# Patient Record
Sex: FEMALE | Race: WHITE | NOT HISPANIC OR LATINO | Employment: PART TIME | URBAN - METROPOLITAN AREA
[De-identification: names, ages, dates, MRNs, and addresses within clinical notes are randomized per-mention and may not be internally consistent; named-entity substitution may affect disease eponyms.]

---

## 2019-03-13 ENCOUNTER — CONSULT (OUTPATIENT)
Dept: CARDIOLOGY | Facility: CLINIC | Age: 84
End: 2019-03-13

## 2019-03-13 ENCOUNTER — LAB REQUISITION (OUTPATIENT)
Dept: LAB | Facility: HOSPITAL | Age: 84
End: 2019-03-13

## 2019-03-13 VITALS
HEART RATE: 62 BPM | SYSTOLIC BLOOD PRESSURE: 138 MMHG | DIASTOLIC BLOOD PRESSURE: 86 MMHG | HEIGHT: 61 IN | BODY MASS INDEX: 21.71 KG/M2 | WEIGHT: 115 LBS

## 2019-03-13 DIAGNOSIS — I48.0 PAROXYSMAL ATRIAL FIBRILLATION (HCC): Primary | ICD-10-CM

## 2019-03-13 DIAGNOSIS — Z00.00 ROUTINE GENERAL MEDICAL EXAMINATION AT A HEALTH CARE FACILITY: ICD-10-CM

## 2019-03-13 DIAGNOSIS — I48.0 PAROXYSMAL ATRIAL FIBRILLATION (HCC): ICD-10-CM

## 2019-03-13 DIAGNOSIS — I42.0 DILATED CARDIOMYOPATHY (HCC): ICD-10-CM

## 2019-03-13 LAB
ALBUMIN SERPL-MCNC: 4.77 G/DL (ref 3.2–4.8)
ALP SERPL-CCNC: 126 U/L (ref 25–100)
ALT SERPL W P-5'-P-CCNC: 30 U/L (ref 7–40)
AST SERPL-CCNC: 31 U/L (ref 0–33)
BILIRUB CONJ SERPL-MCNC: 0 MG/DL (ref 0–0.2)
BILIRUB INDIRECT SERPL-MCNC: 0.2 MG/DL (ref 0.1–1.1)
BILIRUB SERPL-MCNC: 0.2 MG/DL (ref 0.3–1.2)
DEPRECATED FTI SERPL-MCNC: 3.1 TBI
PROT SERPL-MCNC: 6.9 G/DL (ref 5.7–8.2)
T3RU NFR SERPL: 26.9 % (ref 23–37)
T4 SERPL-MCNC: 11.6 MCG/DL (ref 4.7–11.4)
TSH SERPL DL<=0.05 MIU/L-ACNC: 1.74 MIU/ML (ref 0.35–5.35)

## 2019-03-13 PROCEDURE — 80076 HEPATIC FUNCTION PANEL: CPT | Performed by: INTERNAL MEDICINE

## 2019-03-13 PROCEDURE — 99214 OFFICE O/P EST MOD 30 MIN: CPT | Performed by: INTERNAL MEDICINE

## 2019-03-13 PROCEDURE — 84479 ASSAY OF THYROID (T3 OR T4): CPT | Performed by: INTERNAL MEDICINE

## 2019-03-13 PROCEDURE — 93000 ELECTROCARDIOGRAM COMPLETE: CPT | Performed by: INTERNAL MEDICINE

## 2019-03-13 PROCEDURE — 84443 ASSAY THYROID STIM HORMONE: CPT | Performed by: INTERNAL MEDICINE

## 2019-03-13 PROCEDURE — 84436 ASSAY OF TOTAL THYROXINE: CPT | Performed by: INTERNAL MEDICINE

## 2019-03-13 PROCEDURE — 36415 COLL VENOUS BLD VENIPUNCTURE: CPT | Performed by: INTERNAL MEDICINE

## 2019-03-13 RX ORDER — METOPROLOL SUCCINATE 50 MG/1
50 TABLET, EXTENDED RELEASE ORAL NIGHTLY
COMMUNITY

## 2019-03-13 RX ORDER — LEVOTHYROXINE SODIUM 0.07 MG/1
75 TABLET ORAL EVERY MORNING
COMMUNITY

## 2019-03-13 RX ORDER — OMEPRAZOLE 40 MG/1
40 CAPSULE, DELAYED RELEASE ORAL EVERY MORNING
COMMUNITY

## 2019-03-13 RX ORDER — AMIODARONE HYDROCHLORIDE 200 MG/1
200 TABLET ORAL NIGHTLY
COMMUNITY

## 2019-03-13 NOTE — PROGRESS NOTES
Electrophysiology Consult     Melly Galvez  1935  [unfilled]  [unfilled]    03/13/19    DATE OF ADMISSION: (Not on file)  [facility] CARDIOLOGY    Evin Albert MD  [address] / [place] [id number]    Chief Complaint   Patient presents with   • Atrial Fibrillation     Problem List:  1. Persistent Atrial Fibrillation   a. CHADASVasc = 5 previously on Xarelto x 3 weeks 9/2018, discontinued due to GIB  b. Diagnosed 2017, treated with Metoprolol  c. Recurrent atrial fib with RVR in setting of acute CHF, started on Amiodarone 8/2018  2. Chronic Systolic CHF  a. Echocardiogram 8/15/18: EF 20-25%, severe global HK  b. Echocardiogram 11/8/18: EF 30-40%  3. Hematuria with UTI diagnosis 9/2018  4. History of GI Bleed  a. GIB 9/2018 after taking Xarelto x 3 weeks - requiring 2 units blood  b. Severe Fe Deficiency   c. EGD and colonscopy Fall 2018: AVM in right colon - s/p cauterization  d. Repeat blood work 12/2018 - normalized H& H  5. Tobacco abuse   6. Hypothyroidism   7. IBS  8. Restless Leg Syndrome   9. Surgical History  a. Hysterectomy  b. Ovarian cyst removal    History of Present Illness:   83 year old WF with above history who is referred by Dr. Soria for atrial fibrillation and consideration of Watchman Device. She was diagnosed with atrial fibrillation in 2017. At that time, she was fairly asymptomatic and was treated with Metoprolol. However, in August 2018, she presented to [facility] with shortness of breath and found to be in rapid atrial fibrillation. Her echocardiogram at that time showed EF 20-25%. She was started on Amiodarone and converted to NSR. She was also started on Xarelto. Three weeks later, she had a severe GI Bleed and required 2 units of blood. She was found to have an AVM in her right colon which was cauterized. About a week after that, she had some hematuria but was treated for a UTI and it resolved. Since then, she has been on a baby ASA with no  further bleeding issues. In regards to her atrial fibrillation, she is doing well on Amiodarone with no recurrence that she is aware of since November. She denies side effects to Amiodarone. She denies SOB, CP, RUEDA, swelling, dizziness, syncope.   She smokes 1/2 PPD cigarettes. She drinks 2 cups of coffee daily and some tea. She denies ETOH use. 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 Latest echocardiogram 11/2018 showed EF 30-40%. She has been on Metoprolol and was on Lisinopril but it caused a cough. She has an appointment with Dr. Soria at the end of this month.       Allergies   Allergen Reactions   • Lisinopril Cough        Cannot display prior to admission medications because the patient has not been admitted in this contact.            Current Outpatient Medications:   •  amiodarone (PACERONE) 200 MG tablet, Take 200 mg by mouth Daily., Disp: , Rfl:   •  aspirin 81 MG tablet, Take 81 mg by mouth Daily., Disp: , Rfl:   •  levothyroxine (SYNTHROID, LEVOTHROID) 75 MCG tablet, Take 75 mcg by mouth Daily., Disp: , Rfl:   •  metoprolol succinate XL (TOPROL-XL) 50 MG 24 hr tablet, Take 50 mg by mouth Daily., Disp: , Rfl:   •  omeprazole (priLOSEC) 40 MG capsule, Take 40 mg by mouth Daily., Disp: , Rfl:     Social History     Socioeconomic History   • Marital status:      Spouse name: Not on file   • Number of children: Not on file   • Years of education: Not on file   • Highest education level: Not on file   Tobacco Use   • Smoking status: Current Every Day Smoker     Packs/day: 0.50     Types: Cigarettes   Substance and Sexual Activity   • Alcohol use: No     Frequency: Never   • Drug use: No       Family History   Problem Relation Age of Onset   • Heart disease Mother    • Stroke Father        REVIEW OF SYSTEMS:   CONST:  No weight loss, fever, chills, weakness or fatigue.   HEENT:  No visual loss, blurred vision, double vision, yellow sclerae.                   No hearing loss,  "congestion, sore throat.   SKIN:      No rashes, urticaria, ulcers, sores.     RESP:     No shortness of breath, hemoptysis, cough, sputum.   GI:           No anorexia, nausea, vomiting, diarrhea. No abdominal pain, melena.   :         No burning on urination, hematuria or increased frequency.  ENDO:    No diaphoresis, cold or heat intolerance. No polyuria or polydipsia.   NEURO:  No headache, dizziness, syncope, paralysis, ataxia, or parasthesias.                  No change in bowel or bladder control. No history of CVA/TIA  MUSC:    No muscle, back pain, joint pain or stiffness.   HEME:    No anemia, bleeding, bruising. No history of DVT/PE.  PSYCH:  No history of depression, anxiety    Vitals:    03/13/19 1536   BP: 138/86   BP Location: Right arm   Patient Position: Sitting   Pulse: 62   Weight: 52.2 kg (115 lb)   Height: 154.9 cm (61\")                 Physical Exam:  GEN: Well nourished, well-developed, no acute distress  HEENT: Normocephalic, atraumatic, PERRLA, moist mucous membranes  NECK: Supple, NO JVD, no thyromegaly, no lymphadenopathy  CARD: S1S2, RRR, no murmur, gallop, rub, PMI NL  LUNGS: Clear to auscultation, normal respiratory effort  ABDOMEN: Soft, nontender, normal bowel sounds  EXTREMITIES: No gross deformities, no clubbing, cyanosis, or edema  SKIN: Warm, dry, diffuse patches on both legs  NEURO: No focal deficits, alert and oriented x 3  PSYCHIATRIC: Normal affect and mood      I personally viewed and interpreted the patient's EKG/Telemetry/lab data      ECG 12 Lead  Date/Time: 3/13/2019 4:45 PM  Performed by: Emeterio Pearl MD  Authorized by: Emeterio Pearl MD   Rhythm: sinus rhythm  BPM: 62                ICD-10-CM ICD-9-CM   1. Paroxysmal atrial fibrillation (CMS/HCC) I48.0 427.31   2. Dilated cardiomyopathy (CMS/HCC) I42.0 425.4       Assessment and Plan:   1. Paroxysmal Atrial Fibrillation:  - maintaining NSR on Amiodarone since 8/2018  - she has asymptomatic atrial fibrillation " and did have tachycardia induced cardiomyopathy when in rapid atrial fibrillation in 8/2018  - CHADSVasc = 5, currently only on ASA due to GIB from AVM while on Xarelto. She did have AVM cauterized with no current issues. Would try Eliquis 2.5 mg BID. Stop ASA. If she does well, could avoid Watchman Device. If she does have issues with bleeding again, would pursue Watchman Device.   Follow amiodarone protocl per primary MD (LFT,TSH,T4)     2. Dilated Cardiomyopathy:  - EF 30-40% in 11/2018, increased from 20-25% 8/2018  - now that she has been in NSR on Amiodarone, would repeat echocardiogram to assess EF now. She can have that done with Dr. Soria.   - she has current Class I symptoms  - on Metoprolol. If BP can tolerate it, would recommend ARB or Entresto.     Scribed for Emeterio Pearl MD by Ingrid Lora PA-C. 3/13/2019  4:53 PM     I, Emeterio Pearl MD, personally performed the services described in this documentation as scribed by the above named individual in my presence, and it is both accurate and complete.  3/13/2019  4:53 PM

## 2019-03-20 LAB
ALBUMIN SERPL-MCNC: 4.77 G/DL (ref 3.2–4.8)
ALP SERPL-CCNC: 126 U/L (ref 25–100)
ALT SERPL-CCNC: 30 U/L (ref 7–40)
AST SERPL-CCNC: 31 U/L (ref 0–33)
BILIRUB DIRECT SERPL-MCNC: 0 MG/DL (ref 0–0.2)
BILIRUB SERPL-MCNC: 0.2 MG/DL (ref 0.3–1.2)
FT4I SERPL CALC-MCNC: 3.1 (ref 1.4–5.2)
PROT SERPL-MCNC: 6.9 G/DL (ref 5.7–8.2)
T3RU NFR SERPL: 26.9 % (ref 23–37)
T4 SERPL-MCNC: 11.6 MCG/DL (ref 4.7–11.4)
TSH SERPL DL<=0.005 MIU/L-ACNC: 1.74 MIU/ML (ref 0.35–5.35)

## 2019-04-18 ENCOUNTER — TRANSCRIBE ORDERS (OUTPATIENT)
Dept: ADMINISTRATIVE | Facility: HOSPITAL | Age: 84
End: 2019-04-18

## 2019-04-18 DIAGNOSIS — I42.9 CARDIOMYOPATHY, UNSPECIFIED TYPE (HCC): Primary | ICD-10-CM

## 2019-04-18 DIAGNOSIS — R07.9 CHEST PAIN, UNSPECIFIED TYPE: ICD-10-CM

## 2019-04-25 ENCOUNTER — HOSPITAL ENCOUNTER (OUTPATIENT)
Dept: CT IMAGING | Facility: HOSPITAL | Age: 84
Discharge: HOME | End: 2019-04-25
Admitting: RADIOLOGY

## 2019-04-25 VITALS
OXYGEN SATURATION: 97 % | DIASTOLIC BLOOD PRESSURE: 80 MMHG | SYSTOLIC BLOOD PRESSURE: 149 MMHG | BODY MASS INDEX: 22.28 KG/M2 | HEIGHT: 61 IN | TEMPERATURE: 97.8 F | HEART RATE: 62 BPM | RESPIRATION RATE: 18 BRPM | WEIGHT: 118 LBS

## 2019-04-25 DIAGNOSIS — R07.9 CHEST PAIN, UNSPECIFIED TYPE: ICD-10-CM

## 2019-04-25 PROCEDURE — 75571 CT HRT W/O DYE W/CA TEST: CPT

## 2019-04-25 PROCEDURE — 82565 ASSAY OF CREATININE: CPT

## 2019-04-25 RX ORDER — SODIUM CHLORIDE 0.9 % (FLUSH) 0.9 %
3-10 SYRINGE (ML) INJECTION AS NEEDED
Status: DISCONTINUED | OUTPATIENT
Start: 2019-04-25 | End: 2019-04-26 | Stop reason: HOSPADM

## 2019-04-25 RX ORDER — SODIUM CHLORIDE 0.9 % (FLUSH) 0.9 %
3 SYRINGE (ML) INJECTION EVERY 12 HOURS SCHEDULED
Status: DISCONTINUED | OUTPATIENT
Start: 2019-04-25 | End: 2019-04-26 | Stop reason: HOSPADM

## 2019-04-25 RX ADMIN — SODIUM CHLORIDE, PRESERVATIVE FREE 10 ML: 5 INJECTION INTRAVENOUS at 08:10

## 2019-04-25 NOTE — NURSING NOTE
Initial scans done and reviewed by Dr Betancourt.  Calcium noted in both coronaries, Nitro not given.  Report called to [facility].

## 2019-04-26 ENCOUNTER — TELEPHONE (OUTPATIENT)
Dept: INFUSION THERAPY | Facility: HOSPITAL | Age: 84
End: 2019-04-26

## 2019-04-29 DIAGNOSIS — I42.0 DILATED CARDIOMYOPATHY (HCC): Primary | ICD-10-CM

## 2019-04-29 DIAGNOSIS — R93.1 HIGH CORONARY ARTERY CALCIUM SCORE: ICD-10-CM

## 2019-04-29 NOTE — PROGRESS NOTES
[facility] spoke with Dr. Crouch's- elevated coronary calcium score and given her low EF, needs [facility]- order placed

## 2019-04-30 PROBLEM — R93.1 HIGH CORONARY ARTERY CALCIUM SCORE: Status: ACTIVE | Noted: 2019-04-30

## 2019-04-30 LAB — CREAT BLDA-MCNC: 1.1 MG/DL (ref 0.6–1.3)

## 2019-05-03 ENCOUNTER — PREP FOR SURGERY (OUTPATIENT)
Dept: OTHER | Facility: HOSPITAL | Age: 84
End: 2019-05-03

## 2019-05-03 DIAGNOSIS — I25.84 CORONARY ARTERY CALCIFICATION: ICD-10-CM

## 2019-05-03 DIAGNOSIS — I42.0 DILATED CARDIOMYOPATHY (HCC): Primary | ICD-10-CM

## 2019-05-03 DIAGNOSIS — I25.10 CORONARY ARTERY CALCIFICATION: ICD-10-CM

## 2019-05-03 RX ORDER — ONDANSETRON 2 MG/ML
4 INJECTION INTRAMUSCULAR; INTRAVENOUS EVERY 6 HOURS PRN
Status: CANCELLED | OUTPATIENT
Start: 2019-05-03

## 2019-05-03 RX ORDER — SODIUM CHLORIDE 0.9 % (FLUSH) 0.9 %
1-10 SYRINGE (ML) INJECTION AS NEEDED
Status: CANCELLED | OUTPATIENT
Start: 2019-05-03

## 2019-05-03 RX ORDER — SODIUM CHLORIDE 0.9 % (FLUSH) 0.9 %
3 SYRINGE (ML) INJECTION EVERY 12 HOURS SCHEDULED
Status: CANCELLED | OUTPATIENT
Start: 2019-05-03

## 2019-05-03 RX ORDER — ASPIRIN 325 MG
325 TABLET ORAL ONCE
Status: CANCELLED | OUTPATIENT
Start: 2019-05-03 | End: 2019-05-03

## 2019-05-03 RX ORDER — ASPIRIN 325 MG
325 TABLET, DELAYED RELEASE (ENTERIC COATED) ORAL DAILY
Status: CANCELLED | OUTPATIENT
Start: 2019-05-04

## 2019-05-03 RX ORDER — NITROGLYCERIN 0.4 MG/1
0.4 TABLET SUBLINGUAL
Status: CANCELLED | OUTPATIENT
Start: 2019-05-03

## 2019-05-06 ENCOUNTER — APPOINTMENT (OUTPATIENT)
Dept: PREADMISSION TESTING | Facility: HOSPITAL | Age: 84
End: 2019-05-06

## 2019-05-06 DIAGNOSIS — I42.0 DILATED CARDIOMYOPATHY (HCC): ICD-10-CM

## 2019-05-06 LAB
ALBUMIN SERPL-MCNC: 4.1 G/DL (ref 3.5–5.2)
ALBUMIN/GLOB SERPL: 1.4 G/DL
ALP SERPL-CCNC: 130 U/L (ref 39–117)
ALT SERPL W P-5'-P-CCNC: 23 U/L (ref 1–33)
ANION GAP SERPL CALCULATED.3IONS-SCNC: 11 MMOL/L
AST SERPL-CCNC: 28 U/L (ref 1–32)
BILIRUB SERPL-MCNC: 0.2 MG/DL (ref 0.2–1.2)
BUN BLD-MCNC: 14 MG/DL (ref 8–23)
BUN/CREAT SERPL: 15.1 (ref 7–25)
CALCIUM SPEC-SCNC: 9.4 MG/DL (ref 8.6–10.5)
CHLORIDE SERPL-SCNC: 103 MMOL/L (ref 98–107)
CO2 SERPL-SCNC: 26 MMOL/L (ref 22–29)
CREAT BLD-MCNC: 0.93 MG/DL (ref 0.57–1)
DEPRECATED RDW RBC AUTO: 54.4 FL (ref 37–54)
ERYTHROCYTE [DISTWIDTH] IN BLOOD BY AUTOMATED COUNT: 15.4 % (ref 12.3–15.4)
GFR SERPL CREATININE-BSD FRML MDRD: 58 ML/MIN/1.73
GLOBULIN UR ELPH-MCNC: 3 GM/DL
GLUCOSE BLD-MCNC: 135 MG/DL (ref 65–99)
HCT VFR BLD AUTO: 46.5 % (ref 34–46.6)
HGB BLD-MCNC: 15.1 G/DL (ref 12–15.9)
MCH RBC QN AUTO: 31 PG (ref 26.6–33)
MCHC RBC AUTO-ENTMCNC: 32.5 G/DL (ref 31.5–35.7)
MCV RBC AUTO: 95.5 FL (ref 79–97)
PLATELET # BLD AUTO: 295 10*3/MM3 (ref 140–450)
PMV BLD AUTO: 10.7 FL (ref 6–12)
POTASSIUM BLD-SCNC: 4.9 MMOL/L (ref 3.5–5.2)
PROT SERPL-MCNC: 7.1 G/DL (ref 6–8.5)
RBC # BLD AUTO: 4.87 10*6/MM3 (ref 3.77–5.28)
SODIUM BLD-SCNC: 140 MMOL/L (ref 136–145)
WBC NRBC COR # BLD: 9.17 10*3/MM3 (ref 3.4–10.8)

## 2019-05-06 PROCEDURE — 85027 COMPLETE CBC AUTOMATED: CPT | Performed by: NURSE PRACTITIONER

## 2019-05-06 PROCEDURE — 80053 COMPREHEN METABOLIC PANEL: CPT | Performed by: NURSE PRACTITIONER

## 2019-05-06 PROCEDURE — 36415 COLL VENOUS BLD VENIPUNCTURE: CPT

## 2019-05-06 NOTE — DISCHARGE INSTRUCTIONS

## 2019-05-07 ENCOUNTER — HOSPITAL ENCOUNTER (OUTPATIENT)
Facility: HOSPITAL | Age: 84
Setting detail: HOSPITAL OUTPATIENT SURGERY
Discharge: HOME | End: 2019-05-07
Attending: INTERNAL MEDICINE | Admitting: INTERNAL MEDICINE

## 2019-05-07 VITALS
TEMPERATURE: 97.6 F | SYSTOLIC BLOOD PRESSURE: 154 MMHG | BODY MASS INDEX: 22.02 KG/M2 | HEIGHT: 61 IN | DIASTOLIC BLOOD PRESSURE: 100 MMHG | HEART RATE: 52 BPM | RESPIRATION RATE: 16 BRPM | WEIGHT: 116.62 LBS | OXYGEN SATURATION: 92 %

## 2019-05-07 DIAGNOSIS — R93.1 HIGH CORONARY ARTERY CALCIUM SCORE: ICD-10-CM

## 2019-05-07 DIAGNOSIS — I42.0 DILATED CARDIOMYOPATHY (HCC): ICD-10-CM

## 2019-05-07 LAB
ACT BLD: 290 SECONDS (ref 82–152)
CHOLEST SERPL-MCNC: 238 MG/DL (ref 0–200)
HDLC SERPL-MCNC: 53 MG/DL (ref 40–60)
LDLC SERPL CALC-MCNC: 166 MG/DL (ref 0–100)
LDLC/HDLC SERPL: 3.14 {RATIO}
TRIGL SERPL-MCNC: 94 MG/DL (ref 0–150)
VLDLC SERPL-MCNC: 18.8 MG/DL

## 2019-05-07 PROCEDURE — 93458 L HRT ARTERY/VENTRICLE ANGIO: CPT | Performed by: INTERNAL MEDICINE

## 2019-05-07 PROCEDURE — 85347 COAGULATION TIME ACTIVATED: CPT

## 2019-05-07 PROCEDURE — 80061 LIPID PANEL: CPT | Performed by: INTERNAL MEDICINE

## 2019-05-07 PROCEDURE — 25010000002 MIDAZOLAM PER 1 MG: Performed by: INTERNAL MEDICINE

## 2019-05-07 PROCEDURE — 36415 COLL VENOUS BLD VENIPUNCTURE: CPT

## 2019-05-07 PROCEDURE — C1894 INTRO/SHEATH, NON-LASER: HCPCS | Performed by: INTERNAL MEDICINE

## 2019-05-07 PROCEDURE — 99153 MOD SED SAME PHYS/QHP EA: CPT | Performed by: INTERNAL MEDICINE

## 2019-05-07 PROCEDURE — C1769 GUIDE WIRE: HCPCS | Performed by: INTERNAL MEDICINE

## 2019-05-07 PROCEDURE — 0 IOPAMIDOL PER 1 ML: Performed by: INTERNAL MEDICINE

## 2019-05-07 PROCEDURE — C1753 CATH, INTRAVAS ULTRASOUND: HCPCS | Performed by: INTERNAL MEDICINE

## 2019-05-07 PROCEDURE — 99152 MOD SED SAME PHYS/QHP 5/>YRS: CPT | Performed by: INTERNAL MEDICINE

## 2019-05-07 PROCEDURE — 92978 ENDOLUMINL IVUS OCT C 1ST: CPT | Performed by: INTERNAL MEDICINE

## 2019-05-07 PROCEDURE — 25010000002 HEPARIN (PORCINE) PER 1000 UNITS: Performed by: INTERNAL MEDICINE

## 2019-05-07 PROCEDURE — 25010000002 FENTANYL CITRATE (PF) 100 MCG/2ML SOLUTION: Performed by: INTERNAL MEDICINE

## 2019-05-07 PROCEDURE — C1887 CATHETER, GUIDING: HCPCS | Performed by: INTERNAL MEDICINE

## 2019-05-07 RX ORDER — MORPHINE SULFATE 2 MG/ML
1 INJECTION, SOLUTION INTRAMUSCULAR; INTRAVENOUS EVERY 4 HOURS PRN
Status: DISCONTINUED | OUTPATIENT
Start: 2019-05-07 | End: 2019-05-07 | Stop reason: HOSPADM

## 2019-05-07 RX ORDER — ONDANSETRON 2 MG/ML
4 INJECTION INTRAMUSCULAR; INTRAVENOUS EVERY 6 HOURS PRN
Status: DISCONTINUED | OUTPATIENT
Start: 2019-05-07 | End: 2019-05-07 | Stop reason: HOSPADM

## 2019-05-07 RX ORDER — SODIUM CHLORIDE 9 MG/ML
100 INJECTION, SOLUTION INTRAVENOUS CONTINUOUS
Status: ACTIVE | OUTPATIENT
Start: 2019-05-07 | End: 2019-05-07

## 2019-05-07 RX ORDER — SODIUM CHLORIDE 9 MG/ML
250 INJECTION, SOLUTION INTRAVENOUS ONCE AS NEEDED
Status: DISCONTINUED | OUTPATIENT
Start: 2019-05-07 | End: 2019-05-07 | Stop reason: HOSPADM

## 2019-05-07 RX ORDER — FENTANYL CITRATE 50 UG/ML
INJECTION, SOLUTION INTRAMUSCULAR; INTRAVENOUS AS NEEDED
Status: DISCONTINUED | OUTPATIENT
Start: 2019-05-07 | End: 2019-05-07 | Stop reason: HOSPADM

## 2019-05-07 RX ORDER — ALPRAZOLAM 0.25 MG/1
0.25 TABLET ORAL 3 TIMES DAILY PRN
Status: DISCONTINUED | OUTPATIENT
Start: 2019-05-07 | End: 2019-05-07 | Stop reason: HOSPADM

## 2019-05-07 RX ORDER — SODIUM CHLORIDE 9 MG/ML
3 INJECTION, SOLUTION INTRAVENOUS CONTINUOUS
Status: ACTIVE | OUTPATIENT
Start: 2019-05-07 | End: 2019-05-07

## 2019-05-07 RX ORDER — HYDROCODONE BITARTRATE AND ACETAMINOPHEN 7.5; 325 MG/1; MG/1
1 TABLET ORAL EVERY 4 HOURS PRN
Status: DISCONTINUED | OUTPATIENT
Start: 2019-05-07 | End: 2019-05-07 | Stop reason: HOSPADM

## 2019-05-07 RX ORDER — ASPIRIN 325 MG
325 TABLET, DELAYED RELEASE (ENTERIC COATED) ORAL DAILY
Status: DISCONTINUED | OUTPATIENT
Start: 2019-05-08 | End: 2019-05-07 | Stop reason: HOSPADM

## 2019-05-07 RX ORDER — NITROGLYCERIN 0.4 MG/1
0.4 TABLET SUBLINGUAL
Status: DISCONTINUED | OUTPATIENT
Start: 2019-05-07 | End: 2019-05-07 | Stop reason: HOSPADM

## 2019-05-07 RX ORDER — SODIUM CHLORIDE 0.9 % (FLUSH) 0.9 %
3 SYRINGE (ML) INJECTION EVERY 12 HOURS SCHEDULED
Status: DISCONTINUED | OUTPATIENT
Start: 2019-05-07 | End: 2019-05-07 | Stop reason: HOSPADM

## 2019-05-07 RX ORDER — ACETAMINOPHEN 325 MG/1
650 TABLET ORAL EVERY 4 HOURS PRN
Status: DISCONTINUED | OUTPATIENT
Start: 2019-05-07 | End: 2019-05-07 | Stop reason: HOSPADM

## 2019-05-07 RX ORDER — HEPARIN SODIUM 1000 [USP'U]/ML
INJECTION, SOLUTION INTRAVENOUS; SUBCUTANEOUS AS NEEDED
Status: DISCONTINUED | OUTPATIENT
Start: 2019-05-07 | End: 2019-05-07 | Stop reason: HOSPADM

## 2019-05-07 RX ORDER — ASPIRIN 325 MG
325 TABLET ORAL ONCE
Status: DISCONTINUED | OUTPATIENT
Start: 2019-05-07 | End: 2019-05-07

## 2019-05-07 RX ORDER — CLONIDINE HYDROCHLORIDE 0.1 MG/1
0.1 TABLET ORAL ONCE
Status: COMPLETED | OUTPATIENT
Start: 2019-05-07 | End: 2019-05-07

## 2019-05-07 RX ORDER — LIDOCAINE HYDROCHLORIDE 10 MG/ML
INJECTION, SOLUTION EPIDURAL; INFILTRATION; INTRACAUDAL; PERINEURAL AS NEEDED
Status: DISCONTINUED | OUTPATIENT
Start: 2019-05-07 | End: 2019-05-07 | Stop reason: HOSPADM

## 2019-05-07 RX ORDER — NALOXONE HCL 0.4 MG/ML
0.4 VIAL (ML) INJECTION
Status: DISCONTINUED | OUTPATIENT
Start: 2019-05-07 | End: 2019-05-07 | Stop reason: HOSPADM

## 2019-05-07 RX ORDER — MIDAZOLAM HYDROCHLORIDE 1 MG/ML
INJECTION INTRAMUSCULAR; INTRAVENOUS AS NEEDED
Status: DISCONTINUED | OUTPATIENT
Start: 2019-05-07 | End: 2019-05-07 | Stop reason: HOSPADM

## 2019-05-07 RX ORDER — SODIUM CHLORIDE 0.9 % (FLUSH) 0.9 %
1-10 SYRINGE (ML) INJECTION AS NEEDED
Status: DISCONTINUED | OUTPATIENT
Start: 2019-05-07 | End: 2019-05-07 | Stop reason: HOSPADM

## 2019-05-07 RX ADMIN — SODIUM CHLORIDE 3 ML/KG/HR: 9 INJECTION, SOLUTION INTRAVENOUS at 06:45

## 2019-05-07 RX ADMIN — CLONIDINE HYDROCHLORIDE 0.1 MG: 0.1 TABLET ORAL at 09:43

## 2019-05-07 NOTE — H&P
[facility] Consult Note      Referring Provider: Allyn Whiteside,*  Primary Provider:  Evin Albert MD      Patient Care Team:  Evin Albert MD as PCP - General (Internal Medicine)    Chief complaint PAF/cardiomyopathy    Identification: 83-year-old white female    Problem list:      Problem List:  1. Persistent Atrial Fibrillation   a. CHADASVasc = 5 previously on Xarelto x 3 weeks 9/2018, discontinued due to GIB  b. Diagnosed 2017, treated with Metoprolol  c. Recurrent atrial fib with RVR in setting of acute CHF, started on Amiodarone 8/2018  2. Chronic Systolic CHF  a. Echocardiogram 8/15/18: EF 20-25%, severe global HK  b. Echocardiogram 11/8/18: EF 30-40%  3. Hematuria with UTI diagnosis 9/2018  4. History of GI Bleed  a. GIB 9/2018 after taking Xarelto x 3 weeks - requiring 2 units blood  b. Severe Fe Deficiency   c. EGD and colonscopy Fall 2018: AVM in right colon - s/p cauterization  d. Repeat blood work 12/2018 - normalized H& H  5. Tobacco abuse   6. Hypothyroidism   7. IBS  8. Restless Leg Syndrome   9. Surgical History  a. Hysterectomy  b. Ovarian cyst removal        Allergies:  Aspirin and Lisinopril    Home/Current Medications: Amiodarone 200 mg daily, Eliquis 2.5 mg every 12 hours, Synthroid 75 mcg daily, Toprol-XL 50 mg daily, omeprazole 40 mg daily        History of present illness:  83 year old WF with above history who is referred by Dr. Soria for atrial fibrillation and consideration of Watchman Device. She was diagnosed with atrial fibrillation in 2017. At that time, she was fairly asymptomatic and was treated with Metoprolol. However, in August 2018, she presented to [facility] with shortness of breath and found to be in rapid atrial fibrillation. Her echocardiogram at that time showed EF 20-25%. She was started on Amiodarone and converted to NSR. She was also started on Xarelto. Three weeks later, she had a severe GI Bleed and required 2 units of blood. She was found  "to have an AVM in her right colon which was cauterized. About a week after that, she had some hematuria but was treated for a UTI and it resolved. Since then, she has been on a baby ASA with no further bleeding issues. In regards to her atrial fibrillation, she is doing well on Amiodarone with no recurrence that she is aware of since November. She denies side effects to Amiodarone. She denies SOB, CP, RUEDA, swelling, dizziness, syncope.   She smokes 1/2 PPD cigarettes. She drinks 2 cups of coffee daily and some tea. She denies ETOH use. 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   Since her visit with Dr. Pearl she has been on Eliquis and has tolerated the Eliquis well.  He felt that her a rash in her legs was related to aspirin and it has cleared mostly since she has stopped her aspirin.  She would be willing to take an aspirin if she needed it following stent placement.  Last echocardiogram 3/25/2018 showed an EF of 20 to 25%.  She has now been referred for cardiac catheterization as a result.         Cardiac Risk Factors: Tobacco abuse.  Family history of heart disease    Social History: Smokes 1/2 pack/day.  .    Family History: Heart disease in her mother.  Stroke in her father.    Review of Systems  Pertinent positives are listed in the HPI.  All other systems reviewed are negative.         Objective     Vital Sign Min/Max for last 24 hours  Temp  Min: 97.6 °F (36.4 °C)  Max: 97.6 °F (36.4 °C)   BP  Min: 173/82  Max: 180/98   Pulse  Min: 61  Max: 61   Resp  Min: 16  Max: 16   SpO2  Min: 96 %  Max: 96 %   No Data Recorded   Weight  Min: 52.9 kg (116 lb 10 oz)  Max: 52.9 kg (116 lb 10 oz)     Flowsheet Rows      First Filed Value   Admission Height  154.9 cm (61\") Documented at 05/07/2019 0625   Admission Weight  52.9 kg (116 lb 10 oz) Documented at 05/07/2019 0625          Physical Exam:    General: Well-developed well-nourished white female in no acute distress.  HEENT: Pupils equal " and reactive.  Oropharynx is clear with upper and lower plates in place.  CV: Regular rate and rhythm with no murmur gallop or rub.  Resp: Clear with no rales or wheezes.  Equal expansion is noted.  GI: Soft and nontender.  Normal bowel sounds throughout.  No hepatospleno megaly.  : deferred  Musculoskeletal: No gross joint deformities are noted  Skin: Warm and dry  Neurologic: Nonfocal  Psychiatric: Normal mood and affect     EKG: 3/13/2019: Normal sinus rhythm at a rate of 62 bpm    Echo: 3/25/2019: LVEF 20 to 25%    Labs:      Results from last 7 days   Lab Units 05/06/19  1431   SODIUM mmol/L 140   POTASSIUM mmol/L 4.9   CHLORIDE mmol/L 103   CO2 mmol/L 26.0   BUN mg/dL 14   CREATININE mg/dL 0.93   CALCIUM mg/dL 9.4   BILIRUBIN mg/dL 0.2   ALK PHOS U/L 130*   ALT (SGPT) U/L 23   AST (SGOT) U/L 28   GLUCOSE mg/dL 135*            Ejection Fraction: 20-25% by echo 3/19      Results Review:  I reviewed the patients new clinical results.      Assessment:    1. Paroxysmal Atrial Fibrillation:  - maintaining NSR on Amiodarone since 8/2018  - she has asymptomatic atrial fibrillation and did have tachycardia induced cardiomyopathy when in rapid atrial fibrillation in 8/2018  - CHADSVasc = 5, currently only on ASA due to GIB from AVM while on Xarelto. She did have AVM cauterized with no current issues. Would try Eliquis 2.5 mg BID. Stop ASA. If she does well, could avoid Watchman Device. If she does have issues with bleeding again, would pursue Watchman Device.   Follow amiodarone protocl per primary MD (LFT,TSH,T4)      2. Dilated Cardiomyopathy:  - EF 30-40% in 11/2018, increased from 20-25% 8/2018  -Repeat echocardiogram in normal sinus rhythm in March 2019 showed an EF of 20 to 25%.    - she has current Class I symptoms  - on Metoprolol. If BP can tolerate it, would recommend ARB or Entresto.     Plan:    Left heart catheterization plus or minus CBI.  The patient's right radial is quite small and her ulnar is not  much larger.  Therefore we have decided on right groin access.  She understands the risks and benefits and agrees to proceed.    I discussed the patients findings and my recommendations with patient.    Allyn Whiteside MD  05/07/19  6:50 AM

## 2019-05-08 ENCOUNTER — DOCUMENTATION (OUTPATIENT)
Dept: CARDIAC REHAB | Facility: HOSPITAL | Age: 84
End: 2019-05-08

## 2019-05-08 NOTE — PROGRESS NOTES
Patient identified as qualifier for Phase II Cardiac Rehab.  Staff left detailed message for patient to return call regarding program information and scheduling.

## 2019-05-09 LAB
ACT BLD: 147 SECONDS (ref 82–152)
ACT BLD: 169 SECONDS (ref 82–152)
ACT BLD: 197 SECONDS (ref 82–152)

## 2019-05-10 ENCOUNTER — TELEPHONE (OUTPATIENT)
Dept: CARDIOLOGY | Facility: CLINIC | Age: 84
End: 2019-05-10

## 2019-05-10 NOTE — TELEPHONE ENCOUNTER
I spoke with pt and daughter and they do not want order placed at this time.  The daughter is medical professional and understood and knew what the Lifevest was.  She will discuss this with her mother and said she would let us know on Monday.  I did offer my cell phone number if they decided over the weekend so that pt would not be unprotected, she refused my number.  I did also go into detail about her EF, this risk of SCD and how the Lifevest is a bridge to improvement or defibrillator.  She verbally understood and still wants to just let us know on Monday.

## 2019-05-28 ENCOUNTER — TELEPHONE (OUTPATIENT)
Dept: CARDIOLOGY | Facility: CLINIC | Age: 84
End: 2019-05-28

## 2019-06-24 NOTE — TELEPHONE ENCOUNTER
Was finally able to reach pt today and had long talk with her.  She is seeing Dr. Soria in [place] and he started her on this medications a while back and increased the dose to 49/51 a few weeks ago.  As far as the pt is aware she has not had repeat echo.  She does not want f/u with us right now but will continue to follow with Dr. Soria and come back on an as needed basis.  She thanked us for her care so far.

## 2020-03-26 RX ORDER — APIXABAN 2.5 MG/1
TABLET, FILM COATED ORAL
Qty: 60 TABLET | Refills: 0 | Status: SHIPPED | OUTPATIENT
Start: 2020-03-26

## 2020-04-28 RX ORDER — APIXABAN 2.5 MG/1
TABLET, FILM COATED ORAL
Qty: 60 TABLET | Refills: 0 | OUTPATIENT
Start: 2020-04-28

## 2020-05-27 RX ORDER — APIXABAN 2.5 MG/1
TABLET, FILM COATED ORAL
Qty: 60 TABLET | Refills: 0 | OUTPATIENT
Start: 2020-05-27

## 2020-05-29 ENCOUNTER — APPOINTMENT (OUTPATIENT)
Dept: PREADMISSION TESTING | Facility: HOSPITAL | Age: 85
End: 2020-05-29

## 2020-05-29 PROCEDURE — C9803 HOPD COVID-19 SPEC COLLECT: HCPCS

## 2020-05-29 PROCEDURE — U0002 COVID-19 LAB TEST NON-CDC: HCPCS

## 2020-05-29 PROCEDURE — U0004 COV-19 TEST NON-CDC HGH THRU: HCPCS

## 2020-05-30 LAB
REF LAB TEST METHOD: NORMAL
SARS-COV-2 RNA RESP QL NAA+PROBE: NOT DETECTED

## (undated) DEVICE — GUIDE CATHETER: Brand: MACH1™

## (undated) DEVICE — PK CATH CARD 10

## (undated) DEVICE — KT MANIFOLD CATHLAB CUST

## (undated) DEVICE — KT VLV HEMO MAP ACC PLS LG/BORE MTL/INTRO W/TORQ/DEV

## (undated) DEVICE — CATH INTRAVAS ULTRASND EAGLE EYE 2.9FR

## (undated) DEVICE — GW J TP FIX CORE .035 150

## (undated) DEVICE — PINNACLE INTRODUCER SHEATH: Brand: PINNACLE

## (undated) DEVICE — CATH DIAG EXPO M/ PK 6FR FL4/FR4 PIG 3PK

## (undated) DEVICE — PRESSURE MONITORING SET: Brand: TRUWAVE, VAMP

## (undated) DEVICE — GW LUGE .014 182 CM